# Patient Record
(demographics unavailable — no encounter records)

---

## 2017-04-26 NOTE — ER DOCUMENT REPORT
HPI





- HPI


Pain Level: Denies


Context: 


47 yo female c/o facial pain, purulent nasal drainage and cough x 5 days.  

subjective fever, + chills


Associated Symptoms: Chills, Nonproductive cough.  denies: Fever


Exacerbated by: Supine


Relieved by: Denies


Similar symptoms previously: No


Recently seen / treated by doctor: No





- ROS


Systems Reviewed and Negative: Yes All other systems reviewed and negative





- REPRODUCTIVE


LMP: TUBAL


Reproductive: DENIES: Pregnant:





- DERM


Skin Color: Normal, Pink





Past Medical History





- General


Information source: Patient





- Social History


Smoking Status: Current Every Day Smoker


Frequency of alcohol use: None


Drug Abuse: None


Lives with: Family


Family History: Reviewed & Not Pertinent


Pulmonary Medical History: Reports: Hx Bronchitis, Hx Pneumonia - 1998


Neurological Medical History: Reports: Hx Migraine


Renal/ Medical History: Denies: Hx Peritoneal Dialysis


Past Surgical History: Reports: Hx Tubal Ligation





- Immunizations


Hx Diphtheria, Pertussis, Tetanus Vaccination: No





Vertical Provider Document





- CONSTITUTIONAL


Agree With Documented VS: Yes


Exam Limitations: No Limitations


General Appearance: WD/WN, No Apparent Distress





- INFECTION CONTROL


TRAVEL OUTSIDE OF THE [place] IN LAST 30 DAYS: No





- HEENT


HEENT: Atraumatic


Notes: 


+ tenderness right frontal and maxillary sinuses





- NECK


Neck: Normal Inspection, Supple





- RESPIRATORY


Respiratory: Breath Sounds Normal, No Respiratory Distress


O2 Sat by Pulse Oximetry: 96





- CARDIOVASCULAR


Cardiovascular: Regular Rate, Regular Rhythm





- GI/ABDOMEN


Gastrointestinal: Abdomen Soft, Abdomen Non-Tender





- MUSCULOSKELETAL/EXTREMETIES


Musculoskeletal/Extremeties: MAEW, FROM





- NEURO


Level of Consciousness: Awake, Alert, Appropriate





Course





- Vital Signs


Vital signs: 


 











Temp Pulse Resp BP Pulse Ox


 


 98.2 F   98   14   116/79   96 


 


 04/26/17 12:38  04/26/17 12:38  04/26/17 12:38  04/26/17 12:38  04/26/17 12:38














Discharge





- Discharge


Clinical Impression: 


Sinusitis


Qualifiers:


 Sinusitis location: unspecified location Chronicity: acute Recurrence: non-

recurrent Qualified Code(s): J01.90 - Acute sinusitis, unspecified





Instructions:  Sinusitis ([facility]), Antibiotic Therapy ([facility]), Oral Narcotic 

Medication ([facility])


Additional Instructions: 


Take meds as prescribed


recommend Mucinex in addition to prescribed meds


push water intake


follow up with primary care if symptoms persist or worsen


Prescriptions: 


Amoxicillin 875 mg PO BID #20 tablet


Fluconazole [Diflucan] 150 mg PO ONCE PRN #1 tablet


 PRN Reason: 


Hydrocodone/Acetaminophen [Norco 5-325 mg Tablet] 1 tab PO Q4 PRN #10 tablet


 PRN Reason:

## 2017-05-27 NOTE — ER DOCUMENT REPORT
ED Oral Problem





- General


Chief Complaint: Toothache


Stated Complaint: TOOTH PAIN


Time Seen by Provider: 05/27/17 11:02


Mode of Arrival: Ambulatory


Information source: Patient


TRAVEL OUTSIDE OF THE [place] IN LAST 30 DAYS: No





- HPI


Patient complains to provider of: Jaw pain - This 46-year-old female presents 

to the emergency room today stating she had discomfort to her right lower 

jawline at the molar.





- Related Data


Allergies/Adverse Reactions: 


 





No Known Allergies Allergy (Verified 05/27/17 10:43)


 











Past Medical History





- General


Information source: Patient





- Social History


Smoking Status: Current Every Day Smoker


Cigarette use (# per day): Yes


Chew tobacco use (# tins/day): No


Smoking Education Provided: No


Family History: Reviewed & Not Pertinent


Patient has suicidal ideation: No


Patient has homicidal ideation: No





- Past Medical History


Cardiac Medical History: Reports: Hx Hypertension


Pulmonary Medical History: Reports: Hx Bronchitis, Hx Pneumonia - 1998


Neurological Medical History: Reports: Hx Migraine


Renal/ Medical History: Denies: Hx Peritoneal Dialysis


Past Surgical History: Reports: Hx Tubal Ligation





- Immunizations


Hx Diphtheria, Pertussis, Tetanus Vaccination: No





Review of Systems





- Review of Systems


Constitutional: No symptoms reported


EENT: No symptoms reported


Cardiovascular: No symptoms reported


Respiratory: No symptoms reported


Gastrointestinal: No symptoms reported


Genitourinary: No symptoms reported


Female Genitourinary: No symptoms reported


Musculoskeletal: No symptoms reported


Skin: No symptoms reported


Hematologic/Lymphatic: No symptoms reported


Neurological/Psychological: No symptoms reported





Physical Exam





- Vital signs


Vitals: 





 











Temp Pulse Resp BP Pulse Ox


 


 97.8 F   109 H  20   134/107 H  96 


 


 05/27/17 10:41  05/27/17 10:41  05/27/17 10:41  05/27/17 10:41  05/27/17 10:41











Interpretation: Normal





- General


General appearance: Appears well, Alert





- HEENT


Head: Normocephalic, Atraumatic


Eyes: Normal


Pupils: PERRL


Teeth diagram: 


  __________________________














  __________________________





 1 - Tender decay abscess at gumline








- Respiratory


Respiratory status: No respiratory distress


Chest status: Nontender


Breath sounds: Normal


Chest palpation: Normal





- Cardiovascular


Rhythm: Regular


Heart sounds: Normal auscultation


Murmur: No





- Abdominal


Inspection: Normal


Distension: No distension


Bowel sounds: Normal


Tenderness: Nontender


Organomegaly: No organomegaly





- Back


Back: Normal, Nontender





- Extremities


General upper extremity: Normal inspection, Nontender, Normal color, Normal ROM

, Normal temperature


General lower extremity: Normal inspection, Nontender, Normal color, Normal ROM

, Normal temperature, Normal weight bearing.  No: Nora's sign





- Neurological


Neuro grossly intact: Yes


Cognition: Normal


Orientation: AAOx4


[place] Coma Scale Eye Opening: Spontaneous


[place] Coma Scale Verbal: Oriented


[place] Coma Scale Motor: Obeys Commands


[place] Coma Scale Total: 15


Speech: Normal


Motor strength normal: LUE, RUE, LLE, RLE


Sensory: Normal





- Psychological


Associated symptoms: Normal affect, Normal mood





- Skin


Skin Temperature: Warm


Skin Moisture: Dry


Skin Color: Normal





Course





- Re-evaluation


Re-evalutation: 





05/27/17 11:08


Oversedation about her dentition fact that she smokes she will be looking to 

follow-up with a dentist.  She also stated that she does have some discomfort 

which usually occurs about an hour or 2 after she eats that is relieved by 

Tums.  This been ongoing for multiple years.  She has no exertional chest pain 

no exertional shortness of breath no exertional dyspnea no diaphoresis again 

the symptoms have been going on for years and occurs about 2 hours after she 

eats she notices differences with deep fried fatty foods we discussed triggers 

such as caffeine chocolate peppermint was advised to take Prevacid over-the-

counter or Pepcid follow-up with PMD in 3-5 days consider H. pylori testing 

outpatient.





- Vital Signs


Vital signs: 





 











Temp Pulse Resp BP Pulse Ox


 


 97.8 F   109 H  20   134/107 H  96 


 


 05/27/17 10:41  05/27/17 10:41  05/27/17 10:41  05/27/17 10:41  05/27/17 10:41














Discharge





- Discharge


Clinical Impression: 


 Dental abscess, FH: peptic ulcer disease





Disposition: HOME, SELF-CARE


Instructions:  Abscess ([facility]), Penicillin V K ([facility])


Prescriptions: 


Famotidine [Pepcid 20 mg Tablet] 20 mg PO DAILY #12 tablet


Penicillin V Potassium [Penicillin Vk 500 mg Tablet] 500 mg PO BID #20 tablet